# Patient Record
Sex: FEMALE | NOT HISPANIC OR LATINO
[De-identification: names, ages, dates, MRNs, and addresses within clinical notes are randomized per-mention and may not be internally consistent; named-entity substitution may affect disease eponyms.]

---

## 2023-03-23 ENCOUNTER — APPOINTMENT (OUTPATIENT)
Dept: UROLOGY | Facility: CLINIC | Age: 63
End: 2023-03-23
Payer: MEDICAID

## 2023-03-23 VITALS
OXYGEN SATURATION: 99 % | BODY MASS INDEX: 27.97 KG/M2 | WEIGHT: 152 LBS | HEIGHT: 62 IN | SYSTOLIC BLOOD PRESSURE: 138 MMHG | DIASTOLIC BLOOD PRESSURE: 72 MMHG | TEMPERATURE: 97.2 F | HEART RATE: 78 BPM

## 2023-03-23 DIAGNOSIS — Z86.39 PERSONAL HISTORY OF OTHER ENDOCRINE, NUTRITIONAL AND METABOLIC DISEASE: ICD-10-CM

## 2023-03-23 DIAGNOSIS — Z87.891 PERSONAL HISTORY OF NICOTINE DEPENDENCE: ICD-10-CM

## 2023-03-23 DIAGNOSIS — N95.2 POSTMENOPAUSAL ATROPHIC VAGINITIS: ICD-10-CM

## 2023-03-23 DIAGNOSIS — I10 ESSENTIAL (PRIMARY) HYPERTENSION: ICD-10-CM

## 2023-03-23 DIAGNOSIS — Z86.79 PERSONAL HISTORY OF OTHER DISEASES OF THE CIRCULATORY SYSTEM: ICD-10-CM

## 2023-03-23 PROBLEM — Z00.00 ENCOUNTER FOR PREVENTIVE HEALTH EXAMINATION: Status: ACTIVE | Noted: 2023-03-23

## 2023-03-23 PROCEDURE — 99204 OFFICE O/P NEW MOD 45 MIN: CPT

## 2023-03-23 RX ORDER — LEVOTHYROXINE SODIUM 0.17 MG/1
TABLET ORAL
Refills: 0 | Status: ACTIVE | COMMUNITY

## 2023-03-23 RX ORDER — METOPROLOL TARTRATE 75 MG/1
TABLET, FILM COATED ORAL
Refills: 0 | Status: ACTIVE | COMMUNITY

## 2023-03-23 RX ORDER — METFORMIN HYDROCHLORIDE 625 MG/1
TABLET ORAL
Refills: 0 | Status: ACTIVE | COMMUNITY

## 2023-03-23 RX ORDER — ATORVASTATIN CALCIUM 80 MG/1
TABLET, FILM COATED ORAL
Refills: 0 | Status: ACTIVE | COMMUNITY

## 2023-03-23 RX ORDER — ESTRADIOL 0.1 MG/G
0.1 CREAM VAGINAL
Qty: 1 | Refills: 3 | Status: ACTIVE | COMMUNITY
Start: 2023-03-23 | End: 1900-01-01

## 2023-03-28 LAB
APPEARANCE: CLEAR
BACTERIA UR CULT: NORMAL
BACTERIA: NEGATIVE
BILIRUBIN URINE: NEGATIVE
BLOOD URINE: ABNORMAL
COLOR: NORMAL
GLUCOSE QUALITATIVE U: ABNORMAL
HYALINE CASTS: 1 /LPF
KETONES URINE: NEGATIVE
LEUKOCYTE ESTERASE URINE: NEGATIVE
MICROSCOPIC-UA: NORMAL
NITRITE URINE: NEGATIVE
PH URINE: 6
PROTEIN URINE: NEGATIVE
RED BLOOD CELLS URINE: 1 /HPF
SPECIFIC GRAVITY URINE: 1.01
SQUAMOUS EPITHELIAL CELLS: 2 /HPF
UROBILINOGEN URINE: NORMAL
WHITE BLOOD CELLS URINE: 1 /HPF

## 2023-03-28 NOTE — ASSESSMENT
[FreeTextEntry1] : 61 yo F with recurrent UTI, LUTS, vaginal atrophy\par \par - PVR = 28ml\par - UA, culture\par - Discussed possible etiologies for LUTS. Discussed ways to manage them including behavioral modifications such as adequate hydration, controlling constipation, restricting fluids in the evening\par - discussed possible etiologies for UTI. Spent extensive period of time discussed behavioral modification including adequate hydration, cutting back on caffeine intake, timed voiding during the day, importance of controlling any diabetes and chronic constipation and how all of these things could potentially increase risk for persistent or recurrent UTI.\par - Discussed pros and cons of estrace given her vaginal atrophy which is likely contributing to her symptoms. Rx transmitted\par - Renal US

## 2023-03-28 NOTE — HISTORY OF PRESENT ILLNESS
[FreeTextEntry1] : 61 yo F presents with history of recurrent UTI and LUTS for 1 year\par 2022 3 UTIs and most recent was in January \par UTI symptoms - dysuria but has vaginal irritation always\par last saw gyn in Oct\par no fever, no gross hematuria\par Voids every 1-2 hours, nocturia 4-5\par Drinks 5 cups of water\par normal bowel movements\par no children\par not sexually active\par LMP = 10 yrs ago

## 2023-04-13 ENCOUNTER — APPOINTMENT (OUTPATIENT)
Age: 63
End: 2023-04-13
Payer: MEDICAID

## 2023-04-13 DIAGNOSIS — N39.0 URINARY TRACT INFECTION, SITE NOT SPECIFIED: ICD-10-CM

## 2023-04-13 PROCEDURE — 76775 US EXAM ABDO BACK WALL LIM: CPT

## 2023-05-25 ENCOUNTER — APPOINTMENT (OUTPATIENT)
Dept: UROLOGY | Facility: CLINIC | Age: 63
End: 2023-05-25
Payer: MEDICAID

## 2023-05-25 ENCOUNTER — RESULT CHARGE (OUTPATIENT)
Age: 63
End: 2023-05-25

## 2023-05-25 VITALS
SYSTOLIC BLOOD PRESSURE: 135 MMHG | RESPIRATION RATE: 16 BRPM | WEIGHT: 152 LBS | BODY MASS INDEX: 27.97 KG/M2 | TEMPERATURE: 98 F | DIASTOLIC BLOOD PRESSURE: 85 MMHG | HEIGHT: 62 IN | OXYGEN SATURATION: 97 % | HEART RATE: 73 BPM

## 2023-05-25 DIAGNOSIS — R30.0 DYSURIA: ICD-10-CM

## 2023-05-25 DIAGNOSIS — R39.9 UNSPECIFIED SYMPTOMS AND SIGNS INVOLVING THE GENITOURINARY SYSTEM: ICD-10-CM

## 2023-05-25 PROCEDURE — 99214 OFFICE O/P EST MOD 30 MIN: CPT

## 2023-05-25 NOTE — PHYSICAL EXAM
[General Appearance - Well Developed] : well developed [General Appearance - Well Nourished] : well nourished [Abdomen Soft] : soft [Abdomen Tenderness] : non-tender [Urethral Meatus] : normal urethra [FreeTextEntry1] : mild vulvar irritation, yadira vaginal tissue quality and discharge. no BRAIN, prolapse, or levator spasm

## 2023-05-25 NOTE — HISTORY OF PRESENT ILLNESS
[FreeTextEntry1] : 62F presents for initial evaluation of LUTS, recurrent UTI \par Referred by Dr. Lo \par \par PMH significant for: HTN, HLD, hypothyroid, DM2 \par PSH significant for: nothing \par Significant meds: atorvastatin, levothyroxine, metformin, metoprolol \par \par Seen by Dr. Lo for LUTS and recurrent UTIs \par Using estrace cream-- patient states that vaginal area always feels dry\par UCx 3/23: >3 organism, likely contaminated \par Renal US 4/13: unremarkable \par Reports having many yeast infections in the past year, feels that its more bothersome to her than the UTIs. \par Saw PCP 2 weeks ago and was treated for yeast infection with fluconazole.\par Reports having vaginal itchiness this morning.\par \par Patient reports having 1-2 UTIs in the past year \par There have been 0 culture-proven infections \par Offending bacteria include: unknown\par Acute Symptoms of: dysuria, urinary frequency, vaginal dryness\par Symptoms resolve with antibiotics. Denies associated hospitalizations. \par Associated with sexual activity: no\par Current UTI prevention regimen: estrace cream\par \par Patient reports months-long history of urinary frequency\par Reports moderate level of distress \par Associated with nothing\par Previously treated with nothing\par Patient diagnosed with DM2 3 months ago-- feels that frequency is correlated to her glucose and when it is elevated she goes to the bathroom more often.\par Reports that she feels like she has dribbling after completing urination almost every time she urinates and needs to dab herself with a tissue\par \par Daytime Frequency: 5-6\par Nighttime Frequency: 2\par Pads per day: 0\par Straining to void: no\par Subjective Incomplete Emptying: sometimes \par \par Daily Fluid Total: 4 glasses \par Daily Caffeine Total: 0\par  \par Fecal Incontinence: no\par Neurologic Hx, Vision or Balance changes: no\par

## 2023-05-25 NOTE — ASSESSMENT
[FreeTextEntry1] : Ms. Mireles presents for initial evaluation of nonspecific lower urinary tract symptoms and dysuria.  She reports a history of recurrent yeast infections in the absence of pathognomonic discharge, sexual activity, or recent antibiotic use.  Her primary symptom on this front is vaginal itching.  She reports temporary relief with antifungal usage.\par \par Additionally she reports intermittent bothersome dysuria.  No other UTI symptoms are reported.  There are no cultures for me to review that suggest actual infections.\par \par Her exam is within normal limits.  There is no abnormal discharge.  Her vaginal tissue is normal.  There is no anterior vaginal wall fluctuance or tenderness.\par Urinalysis today is notable for small blood and leuk esterase.\par Patient reports routine estrogen cream usage but denies placing it within the vaginal canal and more so around the introitus.\par \par Recommendations\par -Ureaplasma/mycoplasma\par -MRI pelvis to rule out urethral diverticulum\par -Continue Estrace cream, instructions reinforced with patient\par -RTC afterwards to review results